# Patient Record
Sex: FEMALE | Race: WHITE | NOT HISPANIC OR LATINO | Employment: OTHER | URBAN - METROPOLITAN AREA
[De-identification: names, ages, dates, MRNs, and addresses within clinical notes are randomized per-mention and may not be internally consistent; named-entity substitution may affect disease eponyms.]

---

## 2022-03-08 ENCOUNTER — OFFICE VISIT (OUTPATIENT)
Dept: FAMILY MEDICINE | Facility: CLINIC | Age: 37
End: 2022-03-08
Payer: COMMERCIAL

## 2022-03-08 VITALS
RESPIRATION RATE: 12 BRPM | TEMPERATURE: 97.3 F | HEART RATE: 84 BPM | DIASTOLIC BLOOD PRESSURE: 75 MMHG | WEIGHT: 142.4 LBS | OXYGEN SATURATION: 99 % | SYSTOLIC BLOOD PRESSURE: 108 MMHG

## 2022-03-08 DIAGNOSIS — G47.00 INSOMNIA, UNSPECIFIED TYPE: ICD-10-CM

## 2022-03-08 DIAGNOSIS — F90.9 ATTENTION DEFICIT HYPERACTIVITY DISORDER (ADHD), UNSPECIFIED ADHD TYPE: ICD-10-CM

## 2022-03-08 DIAGNOSIS — F41.1 GAD (GENERALIZED ANXIETY DISORDER): Primary | ICD-10-CM

## 2022-03-08 DIAGNOSIS — Z12.4 SCREENING FOR CERVICAL CANCER: ICD-10-CM

## 2022-03-08 PROCEDURE — 99203 OFFICE O/P NEW LOW 30 MIN: CPT | Performed by: INTERNAL MEDICINE

## 2022-03-08 RX ORDER — VALACYCLOVIR HYDROCHLORIDE 1 G/1
1000 TABLET, FILM COATED ORAL 2 TIMES DAILY
COMMUNITY
End: 2023-03-08

## 2022-03-08 RX ORDER — ZOLPIDEM TARTRATE 10 MG/1
10 TABLET ORAL
COMMUNITY
End: 2022-04-18

## 2022-03-08 RX ORDER — DEXTROAMPHETAMINE SACCHARATE, AMPHETAMINE ASPARTATE, DEXTROAMPHETAMINE SULFATE AND AMPHETAMINE SULFATE 1.25; 1.25; 1.25; 1.25 MG/1; MG/1; MG/1; MG/1
5 TABLET ORAL 2 TIMES DAILY
COMMUNITY
End: 2022-03-08

## 2022-03-08 RX ORDER — DEXTROAMPHETAMINE SACCHARATE, AMPHETAMINE ASPARTATE, DEXTROAMPHETAMINE SULFATE AND AMPHETAMINE SULFATE 2.5; 2.5; 2.5; 2.5 MG/1; MG/1; MG/1; MG/1
10 TABLET ORAL 2 TIMES DAILY
COMMUNITY
End: 2022-04-18

## 2022-03-08 ASSESSMENT — PAIN SCALES - GENERAL: PAINLEVEL: NO PAIN (0)

## 2022-03-08 NOTE — PROGRESS NOTES
Subjective   Leslie is a 36 year old who presents for the following health issues     HPI     Establishment of primary care   Leslie Gordon presents to the clinic for establishment of primary care.   ADHD  Insomnia   She has been following in psychiatry and has been taking Adderall 5 mg daily and also taking zolpidem 5 mg as needed.  She notes that Ambien has been helpful, but groggy in the AM.  She drinks minimal caffeine with coffee in the AM and diet-coke in the evening.   Is in need of cognitive behavioural therapist.  She did have a psychiatrist which was city who was prescribing her Adderall as well as Ambien.  She wishes to have her mental health care transferred to [place] and asked if I would be willing to prescribe her medications.  She feels well with current dose of Ambien and wonders if she could reduce the dose of Adderall      Review of Systems   Constitutional, HEENT, cardiovascular, pulmonary, GI, , musculoskeletal, neuro, skin, endocrine and psych systems are negative, except as otherwise noted.      Objective    /75 (BP Location: Right arm, Patient Position: Sitting, Cuff Size: Adult Regular)   Pulse 84   Temp 97.3  F (36.3  C) (Temporal)   Resp 12   Wt 64.6 kg (142 lb 6.4 oz)   LMP 02/20/2022 (Approximate)   SpO2 99%   Breastfeeding No   There is no height or weight on file to calculate BMI.  Physical Exam   GENERAL: healthy, alert and no distress  EYES: Eyes grossly normal to inspection   NECK: no adenopathy, no asymmetry, masses   RESP: lungs clear to auscultation - no rales, rhonchi or wheezes  CV: regular rate and rhythm, normal S1 S2, no S3 or S4, no murmur,    MS: no gross musculoskeletal defects noted, no edema  SKIN: no suspicious lesions or rashes  NEURO: Normal strength and tone, mentation intact and speech normal  PSYCH: mentation appears normal, affect normal/bright    Patient Instructions   (F41.1) TOYA (generalized anxiety disorder)  (primary encounter  diagnosis)  Comment: We will place referral to mental health today and obtain records from psychiatry   Plan: Adult Mental Health  Referral            (Z12.4) Screening for cervical cancer  Comment: Recommend consult in gynecology   Plan: Ob/Gyn Referral            (F90.9) Attention deficit hyperactivity disorder (ADHD), unspecified ADHD type  Comment: As above - follow up in psychiatry - consider tapering off of Adderall  Plan:     (G47.00) Insomnia, unspecified type  Comment: OK to refill ambien as needed.  Also, avoid caffeine in the evening.  There are other medications that you could try  Plan:

## 2022-03-08 NOTE — PATIENT INSTRUCTIONS
(F41.1) TOYA (generalized anxiety disorder)  (primary encounter diagnosis)  Comment: We will place referral to mental health today and obtain records from psychiatry   Plan: Adult Mental Health  Referral            (Z12.4) Screening for cervical cancer  Comment: Recommend consult in gynecology   Plan: Ob/Gyn Referral            (F90.9) Attention deficit hyperactivity disorder (ADHD), unspecified ADHD type  Comment: As above - follow up in psychiatry - consider tapering off of Adderall  Plan:     (G47.00) Insomnia, unspecified type  Comment: OK to refill ambien as needed.  Also, avoid caffeine in the evening.  There are other medications that you could try  Plan:

## 2022-03-14 ENCOUNTER — MYC MEDICAL ADVICE (OUTPATIENT)
Dept: FAMILY MEDICINE | Facility: CLINIC | Age: 37
End: 2022-03-14
Payer: COMMERCIAL

## 2022-03-15 RX ORDER — ZOLPIDEM TARTRATE 10 MG/1
10 TABLET ORAL
Status: CANCELLED | OUTPATIENT
Start: 2022-03-15

## 2022-03-15 NOTE — TELEPHONE ENCOUNTER
PCP, please see attached Mychart and advise.     Patient requesting PCP manage Ambien medication. Script pended

## 2022-03-16 NOTE — TELEPHONE ENCOUNTER
I have not received her psychiatry records.  Can we help to to coordinate faxing records to our office?    Chauncey Capps MD, MD

## 2022-03-17 NOTE — TELEPHONE ENCOUNTER
Please see patient's last message---and advise if VV with you in the future (month?) is a good plan?

## 2022-03-17 NOTE — TELEPHONE ENCOUNTER
Yes, lets plan to schedule a video visit next month.     Can we ensure that Leslie has the correct information to get records faxed?    Chauncey Capps MD

## 2022-03-25 NOTE — TELEPHONE ENCOUNTER
Team Coordinators, can you tell if the records were faxed to us and are in the chart?  I cannot locate them   Please see her most recent my chart message.     If no records, can you notify the patient with the correct medical record fax number to the [facility] ?     Caor Cortez RN  [facility]

## 2022-04-03 ENCOUNTER — HEALTH MAINTENANCE LETTER (OUTPATIENT)
Age: 37
End: 2022-04-03

## 2022-04-13 ENCOUNTER — NURSE TRIAGE (OUTPATIENT)
Dept: FAMILY MEDICINE | Facility: CLINIC | Age: 37
End: 2022-04-13
Payer: COMMERCIAL

## 2022-04-13 ENCOUNTER — VIRTUAL VISIT (OUTPATIENT)
Dept: FAMILY MEDICINE | Facility: CLINIC | Age: 37
End: 2022-04-13
Payer: COMMERCIAL

## 2022-04-13 DIAGNOSIS — U07.1 INFECTION DUE TO 2019 NOVEL CORONAVIRUS: Primary | ICD-10-CM

## 2022-04-13 PROCEDURE — 99213 OFFICE O/P EST LOW 20 MIN: CPT | Mod: 95 | Performed by: INTERNAL MEDICINE

## 2022-04-13 NOTE — PROGRESS NOTES
Leslie is a 36 year old who is being evaluated via a billable video visit.      How would you like to obtain your AVS? Taya  If the video visit is dropped, the invitation should be resent by: Text to cell phone: TAYA  Will anyone else be joining your video visit? No      Video Start Time: 1:19 PM        Subjective   Leslie is a 36 year old who presents for the following health issues     HPI       Chief Complaint   Patient presents with     Suspected Covid     Tested positive yesterday, fever 102     This is a pleasant healthy 36-year-old who I am seen by video visit for Covid.  The patient's exposure she thinks was Thursday and then she tested positive last evening with symptoms that just started yesterday.  She does not have diabetes, obesity, heart disease or lung disease or immune issues.  She is not on chemotherapy and she is not pregnant.    Her symptoms consist of fevers up to 103, body aches, yesterday, and feeling exhausted.  She has a cough that is occasionally productive.  Occasional sore throat.  The cough can be productive.  She does not have abdominal pain and she is eating and drinking adequately.  She does not have asthma.  She does not smoke or drink excessively.  She otherwise is felt well.  She feels as if her body is somewhat constricted and because of that can feel a bit short of breath at times but is not severe      Vitals:  No vitals were obtained today due to virtual visit.    Physical Exam   GENERAL: Healthy, alert and no distress  EYES: Eyes grossly normal to inspection.  No discharge or erythema, or obvious scleral/conjunctival abnormalities.  RESP: No audible wheeze, cough, or visible cyanosis.  No visible retractions or increased work of breathing.    SKIN: Visible skin clear. No significant rash, abnormal pigmentation or lesions.  NEURO: Cranial nerves grossly intact.  Mentation and speech appropriate for age.  PSYCH: Mentation appears normal, affect normal/bright, judgement and  insight intact, normal speech and appearance well-groomed.      ASSESSMENT:  covid 19 M otherwise healthy patient who is low risk of serious complication based on her history and the fact that she has been immunized.  We discussed the fact that there can be side effects of medication and she is in a low risk category.  I did offer her Paxil a bit and discussed with her the effectiveness as well as the potential side effects which she understands.  At this point in time she declined this but knows that she needs to contact us or go to the ER if she does get shortness of breath or worsening symptoms.  I explained that she can use Tylenol and Advil and should stay hydrated.  If her symptoms do not improve over the next few days she will let us know.    Christopher Garcia M.D.            Video-Visit Details    Type of service:  Video Visit    Video End Time:1:34 PM    Originating Location (pt. Location): Home    Distant Location (provider location):  [facility]     Platform used for Video Visit: Jemma

## 2022-04-13 NOTE — TELEPHONE ENCOUNTER
"Pt's  called along with his wife.     1. COVID-19 DIAGNOSIS: \"Who made your COVID-19 diagnosis?\" \"Was it confirmed by a positive lab test or self-test?\" If not diagnosed by a doctor (or NP/PA), ask \"Are there lots of cases (community spread) where you live?\" Note: See [facility] health department website, if unsure.      Negative home test on Saturday and Sunday, two positive tests on Tuesday  2. COVID-19 EXPOSURE: \"Was there any known exposure to COVID before the symptoms began?\" CDC Definition of close contact: within 6 feet (2 meters) for a total of 15 minutes or more over a 24-hour period.       Known exposure on Thursday 4/7/22. This is pt's first time having    3. ONSET: \"When did the COVID-19 symptoms start?\"       Monday 4 pm.   4. WORST SYMPTOM: \"What is your worst symptom?\" (e.g., cough, fever, shortness of breath, muscle aches)      Fever, body aches, cough, SOB. Racing heart feeling yesterday - no much today.   5. COUGH: \"Do you have a cough?\" If Yes, ask: \"How bad is the cough?\"        Hoarse cough, deep.   6. FEVER: \"Do you have a fever?\" If Yes, ask: \"What is your temperature, how was it measured, and when did it start?\"      Fever started 36 hours. Yesterday 5 pm - sweats at night. Tylenol helps with fever some.   7. RESPIRATORY STATUS: \"Describe your breathing?\" (e.g., shortness of breath, wheezing, unable to speak)       No.   8. BETTER-SAME-WORSE: \"Are you getting better, staying the same or getting worse compared to yesterday?\"  If getting worse, ask, \"In what way?\"      Sounds like she is getting worse.   9. HIGH RISK DISEASE: \"Do you have any chronic medical problems?\" (e.g., asthma, heart or lung disease, weak immune system, obesity, etc.)      No.   10. VACCINE: \"Have you had the COVID-19 vaccine?\" If Yes, ask: \"Which one, how many shots, when did you get it?\"        Vaccinated and boosted one time.   11. BOOSTER: \"Have you received your COVID-19 booster?\" If Yes, ask: \"Which one and when " "did you get it?\"        12/19/21 - Booster 1st Moderna.   12. PREGNANCY: \"Is there any chance you are pregnant?\" \"When was your last menstrual period?\"        No.   13. OTHER SYMPTOMS: \"Do you have any other symptoms?\"  (e.g., chills, fatigue, headache, loss of smell or taste, muscle pain, sore throat)        Chills, fatigue, headache, loss of smell, low appetite, has apple sauce and tea, muscle pain, sore throat. Able to walk to the bathroom, mostly in bed.   14. O2 SATURATION MONITOR:  \"Do you use an oxygen saturation monitor (pulse oximeter) at home?\" If Yes, ask \"What is your reading (oxygen level) today?\" \"What is your usual oxygen saturation reading?\" (e.g., 95%)        Does not have this in the home. Advised to purchase.     Video visit scheduled within 3 hrs.     Reason for Disposition    MILD difficulty breathing (e.g., minimal/no SOB at rest, SOB with walking, pulse <100)    Additional Information    Negative: SEVERE difficulty breathing (e.g., struggling for each breath, speaks in single words)    Negative: Difficult to awaken or acting confused (e.g., disoriented, slurred speech)    Negative: Bluish (or gray) lips or face now    Negative: Shock suspected (e.g., cold/pale/clammy skin, too weak to stand, low BP, rapid pulse)    Negative: Sounds like a life-threatening emergency to the triager    Negative: [1] Diagnosed or suspected COVID-19 AND [2] symptoms lasting 3 or more weeks    Negative: [1] COVID-19 exposure AND [2] no symptoms    Negative: COVID-19 vaccine reaction suspected (e.g., fever, headache, muscle aches) occurring 1 to 3 days after getting vaccine    Negative: COVID-19 vaccine, questions about    Negative: [1] Lives with someone known to have influenza (flu test positive) AND [2] flu-like symptoms (e.g., cough, runny nose, sore throat, SOB; with or without fever)    Negative: [1] Adult with possible COVID-19 symptoms AND [2] triager concerned about severity of symptoms or other causes    " Negative: COVID-19 and breastfeeding, questions about    Negative: SEVERE or constant chest pain or pressure  (Exception: Mild central chest pain, present only when coughing.)    Negative: MODERATE difficulty breathing (e.g., speaks in phrases, SOB even at rest, pulse 100-120)    Negative: Headache and stiff neck (can't touch chin to chest)    Negative: Oxygen level (e.g., pulse oximetry) 90 percent or lower    Negative: Chest pain or pressure    Negative: Patient sounds very sick or weak to the triager    Negative: Fever > 103 F (39.4 C)    Negative: [1] Fever > 101 F (38.3 C) AND [2] over 60 years of age    Negative: [1] Fever > 100.0 F (37.8 C) AND [2] bedridden (e.g., nursing home patient, CVA, chronic illness, recovering from surgery)    Negative: HIGH RISK for severe COVID complications (e.g., weak immune system, age > 64 years, obesity with BMI > 25, pregnant, chronic lung disease or other chronic medical condition) (Exception: Already seen by PCP and no new or worsening symptoms.)    Negative: [1] HIGH RISK patient AND [2] influenza is widespread in the community AND [3] ONE OR MORE respiratory symptoms: cough, sore throat, runny or stuffy nose    Negative: [1] HIGH RISK patient AND [2] influenza exposure within the last 7 days AND [3] ONE OR MORE respiratory symptoms: cough, sore throat, runny or stuffy nose    Negative: Oxygen level (e.g., pulse oximetry) 91 to 94 percent    Protocols used: CORONAVIRUS (COVID-19) DIAGNOSED OR [id number] 1.18.2022

## 2022-04-18 ENCOUNTER — VIRTUAL VISIT (OUTPATIENT)
Dept: FAMILY MEDICINE | Facility: CLINIC | Age: 37
End: 2022-04-18
Payer: COMMERCIAL

## 2022-04-18 DIAGNOSIS — F51.01 PRIMARY INSOMNIA: Primary | ICD-10-CM

## 2022-04-18 PROCEDURE — 99213 OFFICE O/P EST LOW 20 MIN: CPT | Mod: 95 | Performed by: INTERNAL MEDICINE

## 2022-04-18 RX ORDER — ZOLPIDEM TARTRATE 10 MG/1
10 TABLET ORAL
Qty: 30 TABLET | Refills: 0 | Status: SHIPPED | OUTPATIENT
Start: 2022-04-18

## 2022-04-18 NOTE — PROGRESS NOTES
Leslie is a 36 year old who is being evaluated via a billable video visit.      How would you like to obtain your AVS? MyChart  If the video visit is dropped, the invitation should be resent by: Text to cell phone: [phone]  Will anyone else be joining your video visit? No    Video Start Time: 5:41 PM        Subjective   Leslie is a 36 year old who presents for the following health issues     HPI     Primary insomnia   I spoe to Leslie Ty Gordon via video visit today to discuss insomnia.  She has taken trazodone in the past without benefit.  Also has taken benzodiazepine with feeling of over medication.  Over the past two years she has done well with current dose of zolpidem.  She has taken between 5-10 mg three times per week to help with sleep.  She requests refill of this medication today.      Review of Systems   Constitutional, HEENT, cardiovascular, pulmonary, gi and gu systems are negative, except as otherwise noted.      Objective           Vitals:  No vitals were obtained today due to virtual visit.    Physical Exam   GENERAL: Healthy, alert and no distress  EYES: Eyes grossly normal to inspection.  No discharge or erythema, or obvious scleral/conjunctival abnormalities.  RESP: No audible wheeze, cough, or visible cyanosis.  No visible retractions or increased work of breathing.    SKIN: Visible skin clear. No significant rash, abnormal pigmentation or lesions.  NEURO: Cranial nerves grossly intact.  Mentation and speech appropriate for age.  PSYCH: Mentation appears normal, affect normal/bright, judgement and insight intact, normal speech and appearance well-groomed.      (F51.01) Primary insomnia  (primary encounter diagnosis)  Comment: OK to refill ambien to have available as needed   Plan: zolpidem (AMBIEN) 10 MG tablet                     Video-Visit Details    Type of service:  Video Visit    Video End Time:5:57 PM    Originating Location (pt. Location): Home    Distant Location (provider  location):  [facility]     Platform used for Video Visit: Jemma

## 2022-04-18 NOTE — TELEPHONE ENCOUNTER
Connected with Pt. States that PCP was coordinating obtaining records.    Dr. Capps, have you seen these?

## 2022-10-03 ENCOUNTER — HEALTH MAINTENANCE LETTER (OUTPATIENT)
Age: 37
End: 2022-10-03

## 2023-03-07 ENCOUNTER — NURSE TRIAGE (OUTPATIENT)
Dept: FAMILY MEDICINE | Facility: CLINIC | Age: 38
End: 2023-03-07
Payer: COMMERCIAL

## 2023-03-07 NOTE — TELEPHONE ENCOUNTER
Reason for Call:  Appointment Request    Patient requesting this type of appt:  Office visit    Requested provider: Chauncey Capps    Reason patient unable to be scheduled: Not within requested timeframe    When does patient want to be seen/preferred time: 1-2 days    Comments: Patient has a spot under armpit that burns and itches and would like to see provider while it is there so would like to be seen in the next few days    Could we send this information to you in [facility] or would you prefer to receive a phone call?:   Patient would prefer a phone call   Okay to leave a detailed message?: Yes at Cell number on file:    Telephone Information:   Mobile [phone]       Call taken on 3/7/2023 at 11:23 AM by Jadyn Soni

## 2023-03-07 NOTE — TELEPHONE ENCOUNTER
Writer called patient to triage symptoms reported below     APPEARANCE OF RASH: red, flat, round area   LOCATION: under armpit on one side   NUMBER: one area   SIZE: size of a quarter   ONSET: first noticed years ago - comes and goes   ITCHING: yes   PAIN: yes 6-7/10  OTHER SYMPTOMS: denies fever     Denies band/stripe or small blisters grouped together. States she had this looked at many years ago while living in [place] to rule out shingles.     Patient would like an appointment as soon as possible. Writer scheduled patient for team appointment on 3/8/23 per patient request:    3/8/2023 10:00 AM (Arrive by 9:40 AM) Genoveva Orozco PA-C [facility]     No further questions or concerns at this time.    Liz Charles RN  [facility]    Reason for Disposition    Localized rash present > 7 days    Additional Information    Negative: Sounds like a life-threatening emergency to the triager    Negative: Athlete's Foot suspected (i.e., itchy rash between the toes)    Negative: Insect bite(s) suspected    Negative: Jock Itch suspected (i.e., itchy rash on inner thighs near genital area)    Negative: Localized lump (or swelling) without redness or rash    Negative: Poison ivy, oak, or sumac contact suspected    Negative: Rash of female genital area (vulva)    Negative: Rash of male genital area (penis or scrotum)    Negative: Redness of immunization site    Negative: Shingles suspected (i.e., painful rash, multiple small blisters grouped together in one area of body; dermatomal distribution)    Negative: Small spot, skin growth, or mole    Negative: Wound infection suspected (i.e., pain, spreading redness, or pus; in a cut, puncture, scrape or sutured wound)    Negative: Fever and localized purple or blood-colored spots or dots that are not from injury or friction    Negative: Fever and localized rash is very painful    Negative: Patient sounds very sick or weak to  the triager    Negative: Looks like a boil, infected sore, deep ulcer, or other infected rash (spreading redness, pus)    Negative: Painful rash with multiple small blisters grouped together (i.e., dermatomal distribution or 'band' or 'stripe')    Negative: Localized rash is very painful (no fever)    Negative: Localized purple or blood-colored spots or dots that are not from injury or friction (no fever)    Negative: Lyme disease suspected (e.g., bull's-eye rash or tick bite / exposure)    Negative: Patient wants to be seen    Negative: Tender bumps in armpits    Negative: Pimples (localized) and no improvement after using CARE ADVICE    Negative: SEVERE local itching persists after 2 days of steroid cream    Negative: Applying cream or ointment and it causes severe itch, burning, or pain    Negative: Medication patch causing local rash or itching    Protocols used: RASH OR REDNESS - [id number]

## 2023-03-08 ENCOUNTER — OFFICE VISIT (OUTPATIENT)
Dept: FAMILY MEDICINE | Facility: CLINIC | Age: 38
End: 2023-03-08
Payer: COMMERCIAL

## 2023-03-08 VITALS
WEIGHT: 134.3 LBS | SYSTOLIC BLOOD PRESSURE: 118 MMHG | BODY MASS INDEX: 21.58 KG/M2 | RESPIRATION RATE: 15 BRPM | TEMPERATURE: 98.2 F | HEIGHT: 66 IN | HEART RATE: 83 BPM | DIASTOLIC BLOOD PRESSURE: 75 MMHG | OXYGEN SATURATION: 100 %

## 2023-03-08 DIAGNOSIS — R21 RASH: Primary | ICD-10-CM

## 2023-03-08 DIAGNOSIS — A60.00 GENITAL HERPES SIMPLEX, UNSPECIFIED SITE: ICD-10-CM

## 2023-03-08 DIAGNOSIS — Z23 HIGH PRIORITY FOR 2019-NCOV VACCINE: ICD-10-CM

## 2023-03-08 PROCEDURE — 90686 IIV4 VACC NO PRSV 0.5 ML IM: CPT | Performed by: PHYSICIAN ASSISTANT

## 2023-03-08 PROCEDURE — 99213 OFFICE O/P EST LOW 20 MIN: CPT | Mod: 25 | Performed by: PHYSICIAN ASSISTANT

## 2023-03-08 PROCEDURE — 0134A COVID-19 VACCINE BIVALENT BOOSTER 18+ (MODERNA): CPT | Performed by: PHYSICIAN ASSISTANT

## 2023-03-08 PROCEDURE — 91313 COVID-19 VACCINE BIVALENT BOOSTER 18+ (MODERNA): CPT | Performed by: PHYSICIAN ASSISTANT

## 2023-03-08 PROCEDURE — 90471 IMMUNIZATION ADMIN: CPT | Performed by: PHYSICIAN ASSISTANT

## 2023-03-08 RX ORDER — VALACYCLOVIR HYDROCHLORIDE 500 MG/1
500 TABLET, FILM COATED ORAL 2 TIMES DAILY
Qty: 6 TABLET | Refills: 2 | Status: SHIPPED | OUTPATIENT
Start: 2023-03-08 | End: 2023-03-11

## 2023-03-08 RX ORDER — NYSTATIN 100000 U/G
OINTMENT TOPICAL 2 TIMES DAILY
Qty: 30 G | Refills: 1 | Status: SHIPPED | OUTPATIENT
Start: 2023-03-08

## 2023-03-08 ASSESSMENT — PAIN SCALES - GENERAL: PAINLEVEL: NO PAIN (0)

## 2023-03-08 NOTE — PROGRESS NOTES
"Assessment and Plan:     (R21) Rash  (primary encounter diagnosis)  Comment: right axilla, now resolving, difficulty to say what it is since almost gone but suspect could be yeast dermatitis given location, does not look like resolving zoster  Plan: nystatin (MYCOSTATIN) 800662 UNIT/GM external         ointment, Adult Dermatology Referral  Derm if recurs, she'll take a picture at onset     (Z23) High priority for 2019-nCoV vaccine  Comment:   Plan: COVID-19,PF,MODERNA BIVALENT 18+Yrs    (A60.00) Genital herpes simplex, unspecified site  Comment: uses valtrex as needed   Plan: valACYclovir (VALTREX) 500 MG tablet      Genoveva Virgen PA-C        Subjective   Leslie is a 37 year old resenting for the following health issues:  Derm Problem      History of Present Illness       Reason for visit:  Skin irratation  Symptom onset:  1-3 days ago    She eats 2-3 servings of fruits and vegetables daily.She consumes 1 sweetened beverage(s) daily.She exercises with enough effort to increase her heart rate 20 to 29 minutes per day.  She exercises with enough effort to increase her heart rate 3 or less days per week.   She is taking medications regularly.     Leslie is here for a skin problem  She has a raised area in right axilla which has been coming and going x years  She noticed it surface again on Monday after not noticing it for several years  The area is painful w/burning  It is now resolving but at onset was pink with blisters  She took some of her home supply of valtrex for it    She would like refill of valtrex    Review of Systems   See above      Objective    Ht 1.676 m (5' 6\")   Wt 60.9 kg (134 lb 4.8 oz)   BMI 21.68 kg/m    Body mass index is 21.68 kg/m .     Physical Exam     GENERAL: healthy, alert and no distress  RESP: lungs clear to auscultation - no rales, no rhonchi, no wheezes  CV: regular rates and rhythm, normal S1 S2, no S3 or S4 and no murmur, no click or rub   MS: extremities- no gross " deformities noted, no edema  SKIN: raised pink area about the size of quarter under right axilla, appears to be resolving, not convincing of zoster

## 2023-03-08 NOTE — PATIENT INSTRUCTIONS
Try nystatin to area, keep area clean and dry    Dermatology referral if persists    Take a picture at onset the next time you have the rash

## 2023-05-20 ENCOUNTER — HEALTH MAINTENANCE LETTER (OUTPATIENT)
Age: 38
End: 2023-05-20

## 2023-05-22 ENCOUNTER — OFFICE VISIT (OUTPATIENT)
Dept: FAMILY MEDICINE | Facility: CLINIC | Age: 38
End: 2023-05-22
Payer: COMMERCIAL

## 2023-05-22 VITALS
TEMPERATURE: 98.6 F | HEART RATE: 93 BPM | WEIGHT: 135.3 LBS | HEIGHT: 66 IN | SYSTOLIC BLOOD PRESSURE: 105 MMHG | BODY MASS INDEX: 21.74 KG/M2 | DIASTOLIC BLOOD PRESSURE: 61 MMHG | RESPIRATION RATE: 16 BRPM | OXYGEN SATURATION: 96 %

## 2023-05-22 DIAGNOSIS — J02.9 SORE THROAT: ICD-10-CM

## 2023-05-22 DIAGNOSIS — J06.9 VIRAL URI: Primary | ICD-10-CM

## 2023-05-22 LAB
DEPRECATED S PYO AG THROAT QL EIA: NEGATIVE
GROUP A STREP BY PCR: NOT DETECTED

## 2023-05-22 PROCEDURE — 99213 OFFICE O/P EST LOW 20 MIN: CPT | Performed by: NURSE PRACTITIONER

## 2023-05-22 PROCEDURE — 87651 STREP A DNA AMP PROBE: CPT | Performed by: NURSE PRACTITIONER

## 2023-05-22 RX ORDER — PENICILLIN V POTASSIUM 500 MG/1
500 TABLET, FILM COATED ORAL 2 TIMES DAILY
Qty: 20 TABLET | Refills: 0 | Status: SHIPPED | OUTPATIENT
Start: 2023-05-22 | End: 2023-06-01

## 2023-05-22 ASSESSMENT — ENCOUNTER SYMPTOMS: SORE THROAT: 1

## 2023-05-22 ASSESSMENT — PAIN SCALES - GENERAL: PAINLEVEL: SEVERE PAIN (7)

## 2023-05-22 NOTE — PROGRESS NOTES
"  Assessment & Plan   Problem List Items Addressed This Visit    None  Visit Diagnoses     Viral URI    -  Primary    Sore throat        Relevant Medications    penicillin V (VEETID) 500 MG tablet    Other Relevant Orders    Streptococcus A Rapid Screen w/Reflex to PCR - Clinic Collect (Completed)    Group A Streptococcus PCR Throat Swab           Suspect viral etiology with neg strep. Pending  PCR test. Sent abx that she can start if her PCR test returns positive. Discussed Symptomatic treatments. Follow-up PRACHI Acosta CNP  [facility] EMA Nelson is a 37 year old, presenting for the following health issues:  Pharyngitis (Body aches)         View : No data to display.              Pharyngitis     History of Present Illness       Reason for visit:  Sore throat and fever  Symptom onset:  1-3 days ago    She eats 2-3 servings of fruits and vegetables daily.She consumes 1 sweetened beverage(s) daily.She exercises with enough effort to increase her heart rate 20 to 29 minutes per day.  She exercises with enough effort to increase her heart rate 3 or less days per week.   She is taking medications regularly.       Symptoms started 2 nights ago   Yesterday wasn't feeling great.   Last night throat became severe   Has severe body aches   Feels really congested   No significant cough, it is more from the throat   Daughter was sick last week   PLanning on traveling in 2 days to [facility]       Review of Systems   HENT: Positive for sore throat.    detailed as above          Objective    /61 (BP Location: Right arm, Patient Position: Sitting, Cuff Size: Adult Large)   Pulse 93   Temp 98.6  F (37  C) (Tympanic)   Resp 16   Ht 1.676 m (5' 6\")   Wt 61.4 kg (135 lb 4.8 oz)   LMP 04/29/2023 (Exact Date)   SpO2 96%   BMI 21.84 kg/m    There is no height or weight on file to calculate BMI.  Physical Exam  Constitutional:       Appearance: Normal appearance.   HENT:      " Head: Normocephalic.      Right Ear: Tympanic membrane, ear canal and external ear normal.      Left Ear: Tympanic membrane, ear canal and external ear normal.      Mouth/Throat:      Pharynx: Posterior oropharyngeal erythema present.   Eyes:      Conjunctiva/sclera: Conjunctivae normal.   Cardiovascular:      Rate and Rhythm: Normal rate and regular rhythm.      Heart sounds: Normal heart sounds. No murmur heard.  Pulmonary:      Effort: Pulmonary effort is normal. No respiratory distress.      Breath sounds: Normal breath sounds.   Musculoskeletal:      Cervical back: Normal range of motion.   Lymphadenopathy:      Cervical: Cervical adenopathy present.   Skin:     General: Skin is warm and dry.   Neurological:      Mental Status: She is alert.   Psychiatric:         Mood and Affect: Mood normal.            Results for orders placed or performed in visit on 05/22/23 (from the past 24 hour(s))   Streptococcus A Rapid Screen w/Reflex to PCR - Clinic Collect    Specimen: Throat; Swab   Result Value Ref Range    Group A Strep antigen Negative Negative

## 2023-05-24 ENCOUNTER — NURSE TRIAGE (OUTPATIENT)
Dept: NURSING | Facility: CLINIC | Age: 38
End: 2023-05-24
Payer: COMMERCIAL

## 2023-05-25 NOTE — TELEPHONE ENCOUNTER
Nurse Triage SBAR    Is this a 2nd Level Triage? NO    Situation: Patient calling to report suspected sinus infection.    Background: :Patient ws evaluated by provider for possible strep on 5/22/2023.  She has since developed nasal congestion.    Assessment: Patient requesting antibiotic.  She reports that she is currently in [place].     Protocol Recommended Disposition:   Informed patient that we are unable to triage when out of state. Encouraged patient to be evaluated in [place].    Anna Moreno RN  05/24/23 9:09 PM  [facility] Nurse Advisor      Reason for Disposition    Health Information question, no triage required and triager able to answer question    Additional Information    Negative: [1] Caller is not with the adult (patient) AND [2] reporting urgent symptoms    Negative: Lab result questions    Negative: Medication questions    Negative: Caller can't be reached by phone    Negative: Caller has already spoken to PCP or another triager    Negative: RN needs further essential information from caller in order to complete triage    Negative: Requesting regular office appointment    Negative: [1] Caller requesting NON-URGENT health information AND [2] PCP's office is the best resource    Protocols used: INFORMATION ONLY CALL - [facility]

## 2023-11-18 ENCOUNTER — VIRTUAL VISIT (OUTPATIENT)
Dept: URGENT CARE | Facility: CLINIC | Age: 38
End: 2023-11-18
Payer: COMMERCIAL

## 2023-11-18 DIAGNOSIS — B96.89 ACUTE BACTERIAL SINUSITIS: Primary | ICD-10-CM

## 2023-11-18 DIAGNOSIS — J01.90 ACUTE BACTERIAL SINUSITIS: Primary | ICD-10-CM

## 2023-11-18 PROCEDURE — 99213 OFFICE O/P EST LOW 20 MIN: CPT | Mod: 93

## 2023-11-18 NOTE — PROGRESS NOTES
CC: URI sx x 7 weeks    Worsening sinus pain and congestion.  Was initially prescribed prednisone and did nothing.    Continued facial pain this pas week with sinus HAs.  Works as hairdresser.    1. Acute bacterial sinusitis    - amoxicillin-clavulanate (AUGMENTIN) 875-125 MG tablet; Take 1 tablet by mouth 2 times daily for 10 days  Dispense: 20 tablet; Refill: 0     Treat with Augmentin.  Continue with saline nasal spray.  Close Follow-up if no change or new or worsening sx prn.    Omaira Segura MD  [facility]    Phone call duration # 7 minutes.

## 2024-07-27 ENCOUNTER — HEALTH MAINTENANCE LETTER (OUTPATIENT)
Age: 39
End: 2024-07-27

## 2024-12-31 ENCOUNTER — OFFICE VISIT (OUTPATIENT)
Dept: ORTHOPEDICS | Facility: CLINIC | Age: 39
End: 2024-12-31
Payer: COMMERCIAL

## 2024-12-31 ENCOUNTER — ANCILLARY PROCEDURE (OUTPATIENT)
Dept: GENERAL RADIOLOGY | Facility: CLINIC | Age: 39
End: 2024-12-31
Attending: FAMILY MEDICINE
Payer: COMMERCIAL

## 2024-12-31 DIAGNOSIS — M54.17 RADICULAR PAIN OF LUMBOSACRAL REGION: ICD-10-CM

## 2024-12-31 DIAGNOSIS — M62.830 LUMBAR PARASPINAL MUSCLE SPASM: Primary | ICD-10-CM

## 2024-12-31 DIAGNOSIS — M62.830 LUMBAR PARASPINAL MUSCLE SPASM: ICD-10-CM

## 2024-12-31 PROCEDURE — 72100 X-RAY EXAM L-S SPINE 2/3 VWS: CPT | Performed by: STUDENT IN AN ORGANIZED HEALTH CARE EDUCATION/TRAINING PROGRAM

## 2024-12-31 PROCEDURE — 72170 X-RAY EXAM OF PELVIS: CPT | Performed by: RADIOLOGY

## 2024-12-31 PROCEDURE — 99214 OFFICE O/P EST MOD 30 MIN: CPT | Performed by: FAMILY MEDICINE

## 2024-12-31 RX ORDER — CELECOXIB 100 MG/1
100 CAPSULE ORAL 2 TIMES DAILY
Qty: 20 CAPSULE | Refills: 0 | Status: SHIPPED | OUTPATIENT
Start: 2024-12-31

## 2024-12-31 NOTE — LETTER
12/31/2024      RE: Leslie Gordon  [address]  [place] [address]     Dear Colleague,    Thank you for referring your patient, Leslie Gordon, to the [facility] [place]. Please see a copy of my visit note below.    CHIEF COMPLAINT:  Pain of the Lower Back     HISTORY OF PRESENT ILLNESS  Ms. Gordon is a pleasant 39 year old year old female who presents to clinic today with low back pain.  Leslie explains that she was bending over and twisting to lift her child's toy kitchen 1.5 weeks ago. She states her low back locked up and she was unable to get up from the floor for about an hour. She reports pain in left>right low lumbar region.  Denies radiculopathy currently but did have radiating pain to thigh at onset. This has improved. No lower extremity weakness.    Seen virtually by Dr. Luis Carlos Parnell who recommended prednisone course and robaxin.  She was diligent with prednisone at 20mg dose due to side effects. Stopped briefly over mena and resumed two days ago. She states she is a  and is not able to work at this time due to pain and lack of function.    Onset: sudden  Location: bilateral low back  Quality:  sharp, achy, dull  Duration: 12/22/24  Severity: 8/10 at worst  Timing:constant dull pain, sharp when moving  Modifying factors:  resting and non-use makes it better, standing and child pose makes the pain better, movement and use makes it worse  Associated signs & symptoms: pain, tingling down right buttock the first few days after the injury.  Previous similar pain: No  Treatments to date:Chiropractor 12/23/24 which made function significantly worse, Ibuprofen, Prednisone, Methocarbamol which are all helping.    Additional history: as documented    Review of Systems:  Have you recently had a a fever, chills, weight loss? No  Do you have any vision problems? No  Do you have any chest pain or edema? No  Do you have any shortness of  breath or wheezing?  No  Do you have stomach problems? No  Do you have any numbness or focal weakness? No  Do you have diabetes? No  Do you have problems with bleeding or clotting? No  Do you have an rashes or other skin lesions? No    MEDICAL HISTORY  Patient Active Problem List   Diagnosis     Attention deficit hyperactivity disorder (ADHD), unspecified ADHD type     TOYA (generalized anxiety disorder)     Insomnia, unspecified type       Current Outpatient Medications   Medication Sig Dispense Refill     methocarbamol (ROBAXIN) 500 MG tablet Take 1 tablet (500 mg) by mouth 4 times daily. 24 tablet 1     nystatin (MYCOSTATIN) 800441 UNIT/GM external ointment Apply topically 2 times daily (Patient not taking: Reported on 5/22/2023) 30 g 1     predniSONE (DELTASONE) 20 MG tablet Take 2 tablets (40 mg) by mouth daily. 10 tablet 0     valACYclovir (VALTREX) 500 MG tablet Take 1 tablet (500 mg) by mouth 2 times daily for 3 days (Patient not taking: Reported on 5/22/2023) 6 tablet 2     zolpidem (AMBIEN) 10 MG tablet Take 1 tablet (10 mg) by mouth nightly as needed for sleep Takes 1/2 pill (Patient not taking: Reported on 5/22/2023) 30 tablet 0       Allergies   Allergen Reactions     No Known Allergies        Family History   Problem Relation Age of Onset     Parkinsonism Mother      Chronic Obstructive Pulmonary Disease Father      Hypertension Father        Additional medical/Social/Surgical histories reviewed in [facility] and updated as appropriate.       PHYSICAL EXAM  There were no vitals taken for this visit.    General  - normal appearance, in no obvious distress  Musculoskeletal - lumbar spine  - stance: slow to rise and sit  - inspection: normal bone and joint alignment, no obvious scoliosis  - palpation: Lumbar paravertebral spasms bilateral L3-L5 region.  Additional tenderness at right SI joint.  - ROM: Pain with bilateral rotation, flexion past 30 deg, extension  - strength: lower extremities 5/5 in all planes  -  special tests:  (-) straight leg raise bilaterally  (-) slump test  Neuro  - patellar and Achilles DTRs 2+ bilaterally, no sensory or motor deficit, grossly normal coordination, normal muscle tone  Skin  - no ecchymosis, erythema, warmth, or induration, no obvious rash  Psych  - interactive, appropriate, normal mood and affect      IMAGING : XR lumbar 2 views and XR Pelvis 2 views. Final results and radiologist's interpretation, available in the [facility] health record. Images were reviewed with the patient/family members in the office today. My personal interpretation of the performed imaging is      ASSESSMENT & PLAN  Ms. Gordon is a 39 year old year old female who presents to clinic today with acute low back pain without features of radiculitis.    Concern at this time for acute lumbar spasm secondary to possible discogenic pathology.  Radicular symptoms into thigh have improved with prednisone course and no red flags.    Diagnosis:   Acute lumbago with radiculitis  Lumbar paraspinal spasm    -Start physical therapy course and acute spine pathway initiated for urgent appointment  -Gentle HEP provided today to begin prior to PT which I presume will begin next week  -Start celebrex course given stomach discomfort related to prednisone and ibuprofen use. Discontinue ibuprofen.  -Continue robaxin prior to bedtime  -Recommend taking off of work Friday and resting, no work note requested  -Follow up with Dr. Parnell or myself if no improvement in the next 7-10 days or in 4 weeks if improving.  Consideration for MRI if no significant improvement with initial PT intervention and medication changes.    It was a pleasure seeing Leslie today.    Luis Carlos Hilton DO, RACHEL    Primary Care Sports Medicine  Department of Orthopedic Surgery  [facility]      Again, thank you for allowing me to participate in the care of your patient.      Sincerely,    Luis Carlos Hilton DO

## 2024-12-31 NOTE — PATIENT INSTRUCTIONS
WHAT IS A HERNIATED DISK?    A herniated disk is a disk that has bulged out from its proper place in your neck or back. Disks are rubbery cushions between the bones of the spine (vertebrae). Disks act as shock absorbers between each of the bones of the spine. When a disk bulges out, it may press on nearby nerves and cause pain and other symptoms.    Sometimes a herniated disk is called a ruptured disk.    WHAT IS THE CAUSE?    A herniated disk most often results from wear and tear on the spine as you get older. Sometimes it s caused by an injury. You may be more likely to have a herniated disk if you keep straining your back. This could happen, for example, from not using proper technique when you lift, push, or pull something heavy. Being overweight can also put extra stress on your back. You may also be at higher risk for a herniated disk if:    You are a smoker.  You sit for long periods of time without lower back support.  You drive a lot--for example, you are a .  WHAT ARE THE SYMPTOMS?    Symptoms of a herniated disk may start slowly or suddenly. Where you have symptoms depends on where the herniated disk is in your spine. The most common symptoms are numbness, tingling, pain, or weakness in your buttocks, shoulders, legs, or arms.    HOW IS IT DIAGNOSED?    Your healthcare provider will ask about your symptoms, activities, and medical history. Your provider will examine your spine. Tests may include:    Tests of the movement and reflexes of your arms and legs  X-rays or other types of scans of your spine  Electromyogram, which is a test of electrical activity in your muscles  HOW IS IT TREATED?    Your healthcare provider may recommend:    Rest. It's best to try to stay active, so try not to rest in bed longer than 1 to 2 days or the time your provider recommends.  Medicine. Several types of medicines may help lessen back pain. It may be medicine you take by mouth, or your provider may give a  steroid shot into your spine. Take all medicine as recommended by your healthcare provider.  Physical therapy. This may include massage, traction (force applied to your spine to help relieve pressure on your nerves), or other treatments. You may be given exercises to help strengthen your back so you are less likely to hurt it. You may learn how to protect your back when you are working or playing sports.  A neck collar or neck brace. Wearing a brace for a short time may help keep your neck in the right position while it is healing.  With treatment, the pain should get better within a few weeks, but you may keep having some pain for a few months. If you keep having symptoms, your provider may recommend surgery, but usually surgery isn t needed.    HOW CAN I TAKE CARE OF MYSELF?    To help relieve pain:    Take pain medicine according to your healthcare provider s instructions.  Put an ice pack, gel pack, or package of frozen vegetables wrapped in a cloth on the painful area every 3 to 4 hours for up to 20 minutes at a time. After a few days, a heating pad set on low, or a covered hot water bottle, may also help.  Always use good posture to keep extra pressure off your spine.    Stand up straight with your shoulders back and your belly in. If you have to stand for a long time, move around often and shift your weight from one foot to another. If possible, put one foot up on a footrest that is about 6 to 8 inches high. This keeps your back straight and puts less pressure on your spine.  Sit in chairs that give good support for your lower back Keep your feet flat on the floor or up on a foot rest. Get up every 20 minutes or so and stretch.  When you need to lift something heavy, don't bend from your waist. Bend your knees and squat down by the thing you are lifting. Keep your back as straight as possible. Use your thigh muscles instead of your back to do the lifting. Don t twist. Always keep things close to your body  when you lift, lower, or carry them.    When you sleep, find the position that s most comfortable for you and that supports your back. For example:    Lie flat on your back on a firm mattress or on a mattress with a stiff board under it. Put a pillow under your knees when you lie on your back.  Lie on your belly with a pillow under your chest  Lie on your side with a pillow between your legs.  If you cannot get comfortable, try lying flat on your back with your legs raised so that your knees are bent at a 90-degree angle. This is the same angle they would be if you were sitting up straight in a chair. One way to rest in this position is to lie on the floor, bend your knees, and rest your lower legs on the seat of a chair.  Follow your healthcare provider's instructions, including any exercises recommended by your provider. Ask your provider:    How and when you will hear your test results  How long it will take to recover  What activities you should avoid and when you can return to your normal activities  How to take care of yourself at home  What symptoms or problems you should watch for and what to do if you have them  Make sure you know when you should come back for a checkup.    HOW CAN I HELP PREVENT A HERNIATED DISK?    Keep your muscles strong so that they can help support your spine better. Walking and swimming are examples of good exercise for strengthening and protecting your spine.  Lose weight if you are overweight.  Practice good posture.        Herniated Disc Exercises    Side plank: Lie on your side with your legs, hips, and shoulders in a straight line. Prop yourself up onto your forearm with your elbow directly under your shoulder. Lift your hips off the floor and balance on your forearm and the outside of your foot. Try to hold this position for 15 seconds and then slowly lower your hip to the ground. Switch sides and repeat. Work up to holding for 1 minute. This exercise can be made easier by  starting with your knees and hips flexed toward your chest.    Gluteal stretch: Lie on your back with both knees bent. Rest your right ankle over the knee of your left leg. Grasp the thigh of the left leg and pull toward your chest. You will feel a stretch along the buttocks and possibly along the outside of your hip. Hold the stretch for 15 to 30 seconds. Then repeat the exercise with your left ankle over your right knee. Do the exercise 3 times with each leg.    Quadruped arm and leg raise: Get down on your hands and knees. Pull in your belly button and tighten your abdominal muscles to stiffen your spine. While keeping your abdominals tight, raise one arm and the opposite leg away from you. Hold this position for 5 seconds. Lower your arm and leg slowly and change sides. Do this 10 times on each side.    Extension exercise  Lie face down on the floor for 5 minutes. If this hurts too much, lie face down with a pillow under your stomach. This should relieve your leg or back pain. When you can lie on your stomach for 5 minutes without a pillow, you can continue with Part B of this exercise.    After lying on your stomach for 5 minutes, prop yourself up on your elbows for another 5 minutes. If you can do this without having more leg or buttock pain, you can start doing part C of this exercise.    Lie on your stomach with your hands under your shoulders. Then press down on your hands and extend your elbows while keeping your hips flat on the floor. Hold for 1 second and lower yourself to the floor. Do 3 to 5 sets of 10 repetitions. Rest for 1 minute between sets. You should have no pain in your legs when you do this, but it is normal to feel some pain in your lower back.    Do this exercise several times a day.    Dead bug: Lie on your back with your knees bent, arms at your sides, and feet flat on the floor. Draw in your abdomen and tighten your abdominal muscles. While keeping your abdominal muscles tight and knees  bent, lift one leg several inches off the floor, hold for 5 seconds, and then lower it. Repeat this exercise with the opposite leg. Then lift your arm over your head, hold for 5 seconds, and then lower it. Repeat with the opposite arm. Do 5 repetitions with each leg and arm.  Once this exercise gets easy, raise one leg and the opposite arm together. Hold for 5 seconds. Lower your arm and leg and raise the opposite arm and leg up and hold for 5 seconds. Do 3 sets of 5 repetitions.    Walking is also good exercise for you.    If you have a herniated disk, you should not drive or sit for more than 30 minutes at a time.    Developed by [employer].  Published by [employer].  Copyright  2014 [employer] and/or one of its subsidiaries. All rights reserved.

## 2025-01-02 ENCOUNTER — THERAPY VISIT (OUTPATIENT)
Dept: PHYSICAL THERAPY | Facility: CLINIC | Age: 40
End: 2025-01-02
Attending: FAMILY MEDICINE
Payer: COMMERCIAL

## 2025-01-02 DIAGNOSIS — M62.830 LUMBAR PARASPINAL MUSCLE SPASM: ICD-10-CM

## 2025-01-02 DIAGNOSIS — M54.17 RADICULAR PAIN OF LUMBOSACRAL REGION: ICD-10-CM

## 2025-01-02 NOTE — PROGRESS NOTES
PHYSICAL THERAPY EVALUATION  Type of Visit: Evaluation       Fall Risk Screen:  Fall screen completed by: PT  Have you fallen 2 or more times in the past year?: (Patient-Rptd) No  Have you fallen and had an injury in the past year?: (Patient-Rptd) No  Is patient a fall risk?: No    Subjective         Presenting condition or subjective complaint: (Patient-Rptd) Injured my back Jude Dec. 22nd.  Have been in a lot of pain.  Doctor referred me to physical therapist.  LBP after bending/twisting to lift moving heavy boxes/child's toy kitchen at home and was unable to get up from the floor for about an hour.  The back just locked up and had excruciating pain.  Initial onset L>R LB and into thigh.  Chiro made things flare up and so MD gave prednisone and muscle relaxants.  Has noticed little twinges of this type of pain over the past year but always went away quickly.  Date of onset: 12/22/24    Relevant medical history:   ADHD, TOYA, insomnia.  Trauma to tailbone 7 years ago.  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  Prior therapy history for the same diagnosis, illness or injury: (Patient-Rptd) No      Prior Level of Function  Transfers: Independent  Ambulation: Independent  ADL: Independent      Living Environment  Social support: (Patient-Rptd) With a significant other or spouse   Type of home: (Patient-Rptd) House; 2-story; Basement   Stairs to enter the home: (Patient-Rptd) Yes       Ramp: (Patient-Rptd) No   Stairs inside the home: (Patient-Rptd) Yes   Is there a railing: (Patient-Rptd) Yes     Help at home:    Equipment owned:       Employment: (Patient-Rptd) Yes (Patient-Rptd) [employer]  Hobbies/Interests:      Patient goals for therapy: (Patient-Rptd) Work!    Pain assessment: See objective evaluation for additional pain details     Objective         LUMBAR  SPINE EVALUATION  PAIN:   Pain Level at worst 8/10 initially, now 5/10  Pain Location: bilateral low back (muscle spasm mid and upper back).  Some pressure to tailbone, upper gluteals.  Initially felt some pain to R thigh but not anymore.  Pain Quality:  sharp when moving, achy, dull, sudden onset  Pain Frequency:  intermittent  Pain is Worst (time of day):  mid day  Pain is Exacerbated By: movement, chiro made function significantly worse, pushing pump with leg to lift the chair at work, reaching for blow dryer.  Has to be cautious with all moves.  Standing 8 hour work day.  Unable to drive or play with  5 year old child.  Sitting.  Pain is Relieved By:  rest, non use, standing, child's pose feels good during  Pain Progression:  better with rest  Paresthesia (yes/no):  no  Associated symptoms: none    ADDITIONAL HISTORY:    Disturbed sleep (yes/no):  no  Sleeping postures (prone/sup/side R/L): fetal position is helping, supine    Previous episodes (0/1-5/6-10/11+): this year mini episodes of pain that didn't last    Specific Questions:  Cough/Sneeze/Strain (pos/neg): negative  Bowel/Bladder (normal/abnormal): normal  Other red flags: none    INTEGUMENTARY (edema, incisions):   POSTURE: WNL  GAIT:   Weightbearing Status:WBAT  Assistive Device(s): none  Gait Deviations: very cautious with movements, slow vannesa/protected  Balance/Proprioception:   Weight Bearing Alignment: toe out   ROM:   (Degrees) Left AROM Left PROM  Right AROM Right PROM   Hip Flexion WNL  WNL    Hip Extension       Hip Abduction       Hip Adduction       Hip Internal Rotation       Hip External Rotation       Knee Flexion       Knee Extension       Lumbar Side glide Min loss Min loss feels tighter   Lumbar Flexion No loss pain on return   Lumbar Extension Mod loss pressure toward tailbone     Strength:  MYOTOMES:   Left Right   T12-L3 (Hip Flexion) 5 5   L2-4 (Quads)  5 5   L4 (Ankle DF) 5 5   L5 (Great Toe Ext) 5 5   S1 (Toe Raise) 5 5        DERMATOMES: not assessed  NEURAL TENSION:    Left Right   SLR     SLR with DF     Slump negative negative     FUNCTIONAL TESTS: Patient cautious and protective with movements      Postural Observation:   Sitting: good, very protective neutral     Change of posture:  feels good support  Standing:     Lateral Shift: Nil.        Test Movements:   During: produces, abolishes, increases, decreases, no effect, centralizing, peripheralizing   After: better, worse, no better, no worse, no effect, centralized, peripheralized    Symptomatic response Mechanical response    During testing After testing Effect - increased ROM, decreased ROM, or key functional test No Effect   Pretest symptoms standing: Awareness LB strain     Rep FIS       Rep EIS Produce mid back strain NW Improve ext ROM (feels looser)        Pain getting in to prone from standing  Pretest symptoms lying: prone no pain, ELLIOT no pain but is cautious   During testing After testing Effect - increased ROM, decreased ROM, or key functional test No Effect   Rep TANISHA       Rep EIL Mild awareness at tailbone NW just pressure in spine Improve ROM/feels looser after      Provisional Classification: Derangement - Bilateral, symmetrical, symptoms above knee      Principle of Management:  Education:  centralization/peripheralization phenomenon, keep as active as able but listen to the body and goal is treatment now and prevention for the future.   Educate posture and body mechanics, use of lumbar support, slouch over-correct,       Extension principle:  EIS/EIL x 10 every 2-3 hours or prn for relief.      Assessment & Plan   CLINICAL IMPRESSIONS  Medical Diagnosis: Radicular pain of lumbosacral region, Lumbar paraspinal muscle spasm    Treatment Diagnosis: potential lumbar derangement   Impression/Assessment: Patient is a 39 year old female with lumbosacral radicular pain and lumbar paraspinal muscle spasm complaints.  The following significant findings have been  identified: Pain, Decreased ROM/flexibility, and Decreased activity tolerance. These impairments interfere with their ability to perform self care tasks, work tasks, recreational activities, household chores, driving , household mobility, and community mobility as compared to previous level of function.     Clinical Decision Making (Complexity):  Clinical Presentation: Stable/Uncomplicated  Clinical Presentation Rationale: based on medical and personal factors listed in PT evaluation  Clinical Decision Making (Complexity): Low complexity    PLAN OF CARE  Treatment Interventions:  Interventions: Manual Therapy, Neuromuscular Re-education, Therapeutic Activity, Therapeutic Exercise    Long Term Goals     PT Goal 1  Goal Identifier: standing  Goal Description: Patient to stand and work as [employer] for 8 hour day 0/10 pain  Rationale: to maximize safety and independence with performance of ADLs and functional tasks;to maximize safety and independence within the community;to maximize safety and independence with self cares  Goal Progress: Not able to work full 8 hour day, will try 3 clients tomorrow  Target Date: 02/27/25      Frequency of Treatment: 1x/week  Duration of Treatment: 4 weeks tapering to 2x/month x 1 months      Education Assessment:        Risks and benefits of evaluation/treatment have been explained.   Patient/Family/caregiver agrees with Plan of Care.     Evaluation Time:     PT Eval, Low Complexity Minutes (27340): 20       Signing Clinician: Debra Landrum PT

## 2025-01-07 ENCOUNTER — THERAPY VISIT (OUTPATIENT)
Dept: PHYSICAL THERAPY | Facility: CLINIC | Age: 40
End: 2025-01-07
Attending: FAMILY MEDICINE
Payer: COMMERCIAL

## 2025-01-07 DIAGNOSIS — M62.830 LUMBAR PARASPINAL MUSCLE SPASM: ICD-10-CM

## 2025-01-07 DIAGNOSIS — M54.17 RADICULAR PAIN OF LUMBOSACRAL REGION: Primary | ICD-10-CM

## 2025-01-07 PROCEDURE — 97110 THERAPEUTIC EXERCISES: CPT | Mod: GP | Performed by: PHYSICAL THERAPIST

## 2025-01-07 PROCEDURE — 97530 THERAPEUTIC ACTIVITIES: CPT | Mod: GP | Performed by: PHYSICAL THERAPIST

## 2025-01-14 ENCOUNTER — THERAPY VISIT (OUTPATIENT)
Dept: PHYSICAL THERAPY | Facility: CLINIC | Age: 40
End: 2025-01-14
Attending: FAMILY MEDICINE
Payer: COMMERCIAL

## 2025-01-14 DIAGNOSIS — M62.830 LUMBAR PARASPINAL MUSCLE SPASM: ICD-10-CM

## 2025-01-14 DIAGNOSIS — M54.17 RADICULAR PAIN OF LUMBOSACRAL REGION: Primary | ICD-10-CM

## 2025-01-14 PROCEDURE — 97530 THERAPEUTIC ACTIVITIES: CPT | Mod: GP | Performed by: PHYSICAL THERAPIST

## 2025-01-14 PROCEDURE — 97110 THERAPEUTIC EXERCISES: CPT | Mod: GP | Performed by: PHYSICAL THERAPIST

## 2025-01-14 PROCEDURE — 97112 NEUROMUSCULAR REEDUCATION: CPT | Mod: GP | Performed by: PHYSICAL THERAPIST

## 2025-01-21 ENCOUNTER — THERAPY VISIT (OUTPATIENT)
Dept: PHYSICAL THERAPY | Facility: CLINIC | Age: 40
End: 2025-01-21
Payer: COMMERCIAL

## 2025-01-21 DIAGNOSIS — M62.830 LUMBAR PARASPINAL MUSCLE SPASM: ICD-10-CM

## 2025-01-21 DIAGNOSIS — M54.17 RADICULAR PAIN OF LUMBOSACRAL REGION: Primary | ICD-10-CM

## 2025-01-21 PROCEDURE — 97112 NEUROMUSCULAR REEDUCATION: CPT | Mod: GP | Performed by: PHYSICAL THERAPIST

## 2025-01-21 PROCEDURE — 97110 THERAPEUTIC EXERCISES: CPT | Mod: GP | Performed by: PHYSICAL THERAPIST

## 2025-02-04 ENCOUNTER — THERAPY VISIT (OUTPATIENT)
Dept: PHYSICAL THERAPY | Facility: CLINIC | Age: 40
End: 2025-02-04
Payer: COMMERCIAL

## 2025-02-04 DIAGNOSIS — M54.17 RADICULAR PAIN OF LUMBOSACRAL REGION: Primary | ICD-10-CM

## 2025-02-04 DIAGNOSIS — M62.830 LUMBAR PARASPINAL MUSCLE SPASM: ICD-10-CM

## 2025-02-04 PROCEDURE — 97112 NEUROMUSCULAR REEDUCATION: CPT | Mod: GP | Performed by: PHYSICAL THERAPIST

## 2025-02-04 PROCEDURE — 97110 THERAPEUTIC EXERCISES: CPT | Mod: GP | Performed by: PHYSICAL THERAPIST

## 2025-02-04 PROCEDURE — 97140 MANUAL THERAPY 1/> REGIONS: CPT | Mod: GP | Performed by: PHYSICAL THERAPIST

## 2025-02-11 ENCOUNTER — THERAPY VISIT (OUTPATIENT)
Dept: PHYSICAL THERAPY | Facility: CLINIC | Age: 40
End: 2025-02-11
Payer: COMMERCIAL

## 2025-02-11 DIAGNOSIS — M62.830 LUMBAR PARASPINAL MUSCLE SPASM: ICD-10-CM

## 2025-02-11 DIAGNOSIS — M54.17 RADICULAR PAIN OF LUMBOSACRAL REGION: Primary | ICD-10-CM

## 2025-02-11 PROCEDURE — 97112 NEUROMUSCULAR REEDUCATION: CPT | Mod: GP | Performed by: PHYSICAL THERAPIST

## 2025-02-11 PROCEDURE — 97110 THERAPEUTIC EXERCISES: CPT | Mod: GP | Performed by: PHYSICAL THERAPIST

## 2025-02-11 NOTE — PROGRESS NOTES
02/11/25 0500   Appointment Info   Signing clinician's name / credentials Debra Landrum DPT   Total/Authorized Visits 12(E&T)   Visits Used 6   Medical Diagnosis Radicular pain of lumbosacral region, Lumbar paraspinal muscle spasm   PT Tx Diagnosis lumbar derangement   Progress Note/Certification   Onset of illness/injury or Date of Surgery 12/22/24   Therapy Frequency 1x/week   Predicted Duration 2 weeks tapering to 2x/month x 2 months   Progress Note Completed Date 01/02/25   PT Goal 1   Goal Identifier standing   Goal Description Patient to stand and work as [employer]ocle[employer]ist for 8 hour day 0/10 pain   Rationale to maximize safety and independence with performance of ADLs and functional tasks;to maximize safety and independence within the community;to maximize safety and independence with self cares   Goal Progress Able to work full 8 hour day and typically doing well with this, intermittent LBP but overall improving, performs HEP as able to throughout day to manage   Target Date 05/11/25   Subjective Report   Subjective Report Had increased exercise in clinic last visit, overall has increased activity level and reports had a rough week with low back. One day last week wore heels to work and was fine but the next day was more sore.  Two days ago mensrtrual period started also.  Reports had a 2 week moment where the body was doing great, but after last week had a set back. Today 3-4/10 LBP.  EIL does always help.  Has not gone for walks due to cold weather.   Objective Measures   Objective Measures Objective Measure 1;Objective Measure 2;Objective Measure 3;Objective Measure 4   Objective Measure 1   Objective Measure LROM flexion no loss, ext  near no loss, SG no loss B   Objective Measure 2   Objective Measure Patient is moving better with no guarding   Details neutral pelvic and innomiate alignment in prone   Objective Measure 3   Objective Measure (-) SLR, (-) Slump   Details Tendency to stand with excessive  lordosis (patient notes especially if standing in heels).  Tendency for increased lordosis and anterior rib flare with supine abdominal exercises- control improves with cueing.   Objective Measure 4   Objective Measure Oswestry Low Back Disability Index improved from 52% to 22%   Details Yumi STarT back Screening Tool improved from high risk to medium risk   Treatment Interventions (PT)   Interventions Therapeutic Procedure/Exercise;Therapeutic Activity;Neuromuscular Re-education;Manual Therapy   Therapeutic Procedure/Exercise   Therapeutic Procedures: strength, endurance, ROM, flexibility minutes (08023) 27   Ther Proc 1 Review centralization/peripheralization phenomenon, keep as active as able but listen to the body and goal is treatment now and prevention for the future.  Review brushing teeth analogy with ext 2x/day for future prevention or just get in habit of EIS throughout workday to combat flexion postures.  Review eventual progression to recovery of function with flexion, but not until completely painfree or at least 1 week.   Ther Proc 1 - Details Review to perform press-ups last following any exercise   PTRx Ther Proc 1 Prone Press Ups   PTRx Ther Proc 1 - Details HEP   PTRx Ther Proc 2 Repeated Extension in Lying with Lock/Sag   PTRx Ther Proc 2 - Details x 10 produce mild soreness NW improve extension motion and lessens with reps no pain after.   PTRx Ther Proc 3 Standing Extension at Counter Supported   PTRx Ther Proc 3 - Details HEP: x10 EIS without support NE/NE feels good.  Also SAG of hips into counter to simulate press-ups. HEP   PTRx Ther Proc 4 Bridging #1   PTRx Ther Proc 4 - Details x 10   Skilled Intervention Workig on getting ot end range of lumbar extension with PT OP   Patient Response/Progress Lumbar extension responder.  Able to progress with lumbar extensior strengthening and core stabilization.   PTRx Ther Proc 5 Prone Trunk Extension Position A   PTRx Ther Proc 5 - Details x 14 hands  by sides with scap retraction/depression   PTRx Ther Proc 6 Supine Abdominal Exercise #5 (Arm and Leg Extension)   PTRx Ther Proc 6 - Details Supine ab #1 #4 for training cues prevent rib flaring and over arching low back.  Supine Ab #5 x 12 for HEP   Therapeutic Activity   Ther Act 1 HEP: Review minimize repetitive bending and modify daily activities to train proper mechanics, use legs for squat lift with neutral spine, ab set and weight close to the body, discuss neutral spine and use ab set and legs for washing client's hair ec, doing dishes etc. Review modified golfer's lift for giving child a bath , modified 's bow etc etc.  Discuss 1/2 kneel position for bathing kid, picking items up from floor.  HEP: how to have proper mechanics with activities that may require repetition (cleaning house etc).   PTRx Ther Act 1 Body Mechanics - [place] [place]   PTRx Ther Act 1 - Details HEP:  for proper mechanics for now to minimize repetitive bending and future prevention   PTRx Ther Act 2 Golfers Lift with Support   PTRx Ther Act 2 - Details HEP:  for proper mechanics for now to minimize repetitive bending and future prevention   Neuromuscular Re-education   Neuromuscular re-ed of mvmt, balance, coord, kinesthetic sense, posture, proprioception minutes (86315) 8   Neuromuscular Re-education Neuro Re-ed 2;Neuro Re-ed 3;Neuro Re-ed 4   Neuro Re-ed 1 seated ther ball exercise   Neuro Re-ed 1 - Details NOT today- patient doesn't have ball at home: R/L lateral pelvic tilting, A/P pelvic tilting, CW/CCW pelvic circles, gentle bouncing, alt hip flexion and alt knee ext.   Neuro Re-ed 2 NOT TODAY: Side stepping 4x25' without resistance, along with abdominal bracing   Neuro Re-ed 2 - Details Educate avoid excessive lordosis postures, retrain for proper ab set with neutral spine as fundamentals for core strengthening.  Educate change in posture that occurs with wearing high heels, discuss minimize wearing high heels but if is  wearing heels how to activate abdominals to protect/support the low back.   PTRx Neuro Re-ed 1 Posture Correction with Lumbar Roll   PTRx Neuro Re-ed 1 - Details Review keep neutral spine use of lumbar support in all chairs. patient got a support and feels good. discuss may need smaller roll in car for comfort.   PTRx Neuro Re-ed 2 Neutral Spine Slouch Overcorrect   PTRx Neuro Re-ed 2 - Details HEP:  lifetime utilization for prevention with prolonged sitting   PTRx Neuro Re-ed 3 Shoulder Theraband Rows   PTRx Neuro Re-ed 3 - Details HEP: GTB x 15 with ab set   Neuro Re-ed 3 Hooklying TA set   Neuro Re-ed 3 - Details 10 sec x5 train neutral position without rib flare   Manual Therapy   Manual Therapy: Mobilization, MFR, MLD, friction massage minutes (03276) 6   Manual Therapy Manual Therapy 2   Manual Therapy 1 Prone PA glides   Manual Therapy 1 - Details L2-S1 gr III-IV decrease/B   Manual Therapy 2 Review patient may utilize spouse to use football for PA glides or daughter sitting on gluteals/low back during EIL to increase force. listen to the body,   Plan   Home program see ptrx, oleksandr on phone   Updates to plan of care added prone upper body extension, supine ab #5   Plan for next session How posture awareness with standing to avoid excessive lordosis?  Review new supine ab #5, progress as able, also rev new prone upper body extension.  REV GTB Row Add pulldowns with abdominal bracing   Total Session Time   Timed Code Treatment Minutes 41   Total Treatment Time (sum of timed and untimed services) 41       PLAN  Continue therapy per current plan of care.    Beginning/End Dates of Progress Note Reporting Period:  01/02/25 to 02/11/2025    Referring Provider:  Luis Carlos Hilton

## 2025-07-20 ENCOUNTER — HEALTH MAINTENANCE LETTER (OUTPATIENT)
Age: 40
End: 2025-07-20

## 2025-08-10 ENCOUNTER — HEALTH MAINTENANCE LETTER (OUTPATIENT)
Age: 40
End: 2025-08-10